# Patient Record
Sex: MALE
[De-identification: names, ages, dates, MRNs, and addresses within clinical notes are randomized per-mention and may not be internally consistent; named-entity substitution may affect disease eponyms.]

---

## 2019-06-28 PROBLEM — Z00.00 ENCOUNTER FOR PREVENTIVE HEALTH EXAMINATION: Status: ACTIVE | Noted: 2019-06-28

## 2019-07-11 ENCOUNTER — APPOINTMENT (OUTPATIENT)
Dept: UROLOGY | Facility: CLINIC | Age: 49
End: 2019-07-11
Payer: COMMERCIAL

## 2019-07-11 VITALS — SYSTOLIC BLOOD PRESSURE: 120 MMHG | DIASTOLIC BLOOD PRESSURE: 77 MMHG | TEMPERATURE: 98.6 F | HEART RATE: 66 BPM

## 2019-07-11 PROCEDURE — 99204 OFFICE O/P NEW MOD 45 MIN: CPT

## 2019-07-11 NOTE — PHYSICAL EXAM
[General Appearance - Well Developed] : well developed [Normal Appearance] : normal appearance [General Appearance - Well Nourished] : well nourished [Well Groomed] : well groomed [Heart Rate And Rhythm] : Heart rate and rhythm were normal [] : no respiratory distress [Abdomen Soft] : soft [Abdomen Tenderness] : non-tender [Abdomen Hernia] : no hernia was discovered [Costovertebral Angle Tenderness] : no ~M costovertebral angle tenderness [Urethral Meatus] : meatus normal [Penis Abnormality] : normal circumcised penis [Urinary Bladder Findings] : the bladder was normal on palpation [Normal Station and Gait] : the gait and station were normal for the patient's age [Scrotum] : the scrotum was normal [FreeTextEntry1] : grade III left varicocele. easily palpable vasa. 14cc x2.  [No Focal Deficits] : no focal deficits [Skin Color & Pigmentation] : normal skin color and pigmentation [Oriented To Time, Place, And Person] : oriented to person, place, and time [No Palpable Adenopathy] : no palpable adenopathy

## 2019-07-11 NOTE — ASSESSMENT
[FreeTextEntry1] : vasecotmy evaulation\par Options for contraception, including oral contraceptive pills, condoms, intrauterine device, tubal ligation among others, were discussed at length today.\par \par He understands that vasectomy is considered a permanent procedure although it can be reversed with a complex microsurgical vasectomy reversal.  In addition, postprocedure pain was discussed as was a very rare failure rate of vasectomy. Risk of hematoma formation and very rare injury to the testicular artery was discussed. Infection risk was discussed as well.   He understands that he is still considered fertile until a semen analysis demonstrates azoospermia. He also understands that he needs to continue using contraception until azoospermia is verified following his vasectomy. The 30 day waiting period mandated by [facility] was discussed as well.\par will schedule

## 2019-07-11 NOTE — HISTORY OF PRESENT ILLNESS
[FreeTextEntry1] : 48M  3 children 10/13/16\par healthy children\par currently no relationship\par here to discuss vasectomy\par no voidng compaints\par no hematuria\par no dysruia\par no fevers chills\par no family history porstate kidney bladdercancer.